# Patient Record
Sex: FEMALE | Race: OTHER | HISPANIC OR LATINO | URBAN - METROPOLITAN AREA
[De-identification: names, ages, dates, MRNs, and addresses within clinical notes are randomized per-mention and may not be internally consistent; named-entity substitution may affect disease eponyms.]

---

## 2024-09-18 ENCOUNTER — EMERGENCY (EMERGENCY)
Facility: HOSPITAL | Age: 49
LOS: 0 days | Discharge: HOME | End: 2024-09-18
Attending: EMERGENCY MEDICINE
Payer: MEDICAID

## 2024-09-18 VITALS
RESPIRATION RATE: 16 BRPM | SYSTOLIC BLOOD PRESSURE: 102 MMHG | OXYGEN SATURATION: 94 % | HEART RATE: 62 BPM | TEMPERATURE: 100 F | DIASTOLIC BLOOD PRESSURE: 66 MMHG

## 2024-09-18 VITALS
TEMPERATURE: 101 F | DIASTOLIC BLOOD PRESSURE: 67 MMHG | WEIGHT: 225.53 LBS | SYSTOLIC BLOOD PRESSURE: 130 MMHG | OXYGEN SATURATION: 95 % | HEART RATE: 100 BPM | RESPIRATION RATE: 18 BRPM

## 2024-09-18 DIAGNOSIS — R05.9 COUGH, UNSPECIFIED: ICD-10-CM

## 2024-09-18 DIAGNOSIS — J18.9 PNEUMONIA, UNSPECIFIED ORGANISM: ICD-10-CM

## 2024-09-18 DIAGNOSIS — R07.9 CHEST PAIN, UNSPECIFIED: ICD-10-CM

## 2024-09-18 DIAGNOSIS — H91.3 DEAF NONSPEAKING, NOT ELSEWHERE CLASSIFIED: ICD-10-CM

## 2024-09-18 DIAGNOSIS — R50.9 FEVER, UNSPECIFIED: ICD-10-CM

## 2024-09-18 LAB
FLUAV AG NPH QL: SIGNIFICANT CHANGE UP
FLUBV AG NPH QL: SIGNIFICANT CHANGE UP
RSV RNA NPH QL NAA+NON-PROBE: SIGNIFICANT CHANGE UP
SARS-COV-2 RNA SPEC QL NAA+PROBE: SIGNIFICANT CHANGE UP

## 2024-09-18 PROCEDURE — 99285 EMERGENCY DEPT VISIT HI MDM: CPT | Mod: 25

## 2024-09-18 PROCEDURE — 0241U: CPT

## 2024-09-18 PROCEDURE — 71046 X-RAY EXAM CHEST 2 VIEWS: CPT

## 2024-09-18 PROCEDURE — 99284 EMERGENCY DEPT VISIT MOD MDM: CPT

## 2024-09-18 PROCEDURE — 71046 X-RAY EXAM CHEST 2 VIEWS: CPT | Mod: 26

## 2024-09-18 PROCEDURE — 93010 ELECTROCARDIOGRAM REPORT: CPT

## 2024-09-18 PROCEDURE — 93005 ELECTROCARDIOGRAM TRACING: CPT

## 2024-09-18 RX ORDER — AZITHROMYCIN 500 MG/1
500 TABLET, FILM COATED ORAL ONCE
Refills: 0 | Status: COMPLETED | OUTPATIENT
Start: 2024-09-18 | End: 2024-09-18

## 2024-09-18 RX ORDER — CEFPODOXIME PROXETIL 100 MG/5ML
200 GRANULE, FOR SUSPENSION ORAL ONCE
Refills: 0 | Status: COMPLETED | OUTPATIENT
Start: 2024-09-18 | End: 2024-09-18

## 2024-09-18 RX ORDER — ACETAMINOPHEN 325 MG/1
650 TABLET ORAL ONCE
Refills: 0 | Status: COMPLETED | OUTPATIENT
Start: 2024-09-18 | End: 2024-09-18

## 2024-09-18 RX ORDER — AZITHROMYCIN 500 MG/1
1 TABLET, FILM COATED ORAL
Qty: 4 | Refills: 0
Start: 2024-09-18

## 2024-09-18 RX ORDER — CEFPODOXIME PROXETIL 100 MG/5ML
1 GRANULE, FOR SUSPENSION ORAL
Qty: 10 | Refills: 0
Start: 2024-09-18

## 2024-09-18 RX ADMIN — CEFPODOXIME PROXETIL 200 MILLIGRAM(S): 100 GRANULE, FOR SUSPENSION ORAL at 15:35

## 2024-09-18 RX ADMIN — ACETAMINOPHEN 650 MILLIGRAM(S): 325 TABLET ORAL at 14:54

## 2024-09-18 RX ADMIN — AZITHROMYCIN 500 MILLIGRAM(S): 500 TABLET, FILM COATED ORAL at 15:22

## 2024-09-18 NOTE — ED STATDOCS - PHYSICAL EXAMINATION
Well-appearing, NAD, low grad temperature PA NOTE: GEN: AOX3, NAD. HEENT: Throat clear. Airway is patent. EYES: PERRLA. EOMI. Head: NC/AT. NECK: Supple, No JVD. FROM. C-spine non-tender. CV:S1S2, RRR, LUNGS: Non-labored breathing, no tachypnea. O2sat 100% RA. CTA b/l. No w/r/r. CHEST: Equal chest expansion and rise. No deformity. ABD: Soft, NT/ND, no rebound, no guarding. No CVAT. EXT: No e/c/c. 2+ distal pulses. SKIN: No rashes. NEURO: No focal deficits. CN II-XII intact. FROM. 5/5 motor and sensory. ~George Padilla PA-C

## 2024-09-18 NOTE — ED STATDOCS - OBJECTIVE STATEMENT
48 year-old female with no pertinent past medical history presents complaining of chest pain. Pt is deaf and mute, but daughter interprets for her. Pt reportedly has chest pain, coughing, and a fever x3d. Doesn't do sign language but able to communicate with daughter.    used #[id number].

## 2024-09-18 NOTE — ED ADULT TRIAGE NOTE - CHIEF COMPLAINT QUOTE
pt is deaf and mute, Pt BIB family for cc of body aches, fevers, HA, +cough, and pain in chest x 15 days, EKG in progress.

## 2024-09-18 NOTE — ED STATDOCS - ATTENDING APP SHARED VISIT CONTRIBUTION OF CARE
I,Benson Landeros MD,  performed the initial face to face bedside interview with this patient regarding history of present illness, review of symptoms and relevant past medical, social and family history.  I completed an independent physical examination.  I was the initial provider who evaluated this patient. I have signed out the follow up of any pending tests (i.e. labs, radiological studies) to the ACP.  I have communicated the patient’s plan of care and disposition with the ACP.  The history, relevant review of systems, past medical and surgical history, medical decision making, and physical examination was documented by the scribe in my presence and I attest to the accuracy of the documentation.

## 2024-09-18 NOTE — ED STATDOCS - NSFOLLOWUPINSTRUCTIONS_ED_ALL_ED_FT
Neumonía extrahospitalaria en los adultos  Community-Acquired Pneumonia, Adult  La neumonía es taina infección pulmonar que causa inflamación y la acumulación de mucosidad y líquido en los pulmones. Deale puede causar tos y dificultad para respirar. La neumonía extrahospitalaria es aquella que se desarrolla en personas que no están, ni valdovinos estado recientemente, en un hospital u otro centro de atención médica.    Por lo general, la neumonía se desarrolla vandana resultado de taina enfermedad causada por un virus, vandana el resfrío común y la gripe (influenza). También puede ser causada por bacterias u hongos. Mientras que el resfrío y la gripe pueden transmitirse de taina persona a otra (son contagiosos), la neumonía en sí no se considera contagiosa.    ¿Cuáles son las causas?  The human body, showing how a virus travels from the air to a person's lungs.   Esta afección puede ser causada por lo siguiente:  Virus.  Bacterias.  Hongos.  ¿Qué incrementa el riesgo?  Los siguientes factores pueden hacer que sea más propenso a desarrollar esta afección:  Tener más de 65 años o tener ciertas afecciones, por ejemplo:  Taina enfermedad prolongada (crónica), vandana enfermedad pulmonar obstructiva crónica (EPOC), asma, insuficiencia cardíaca, diabetes o enfermedad renal.  Taina afección que incrementa el riesgo de respirar (aspirar) mucosidad y otros líquidos por la boca o la nariz.  Sistema de defensa del organismo (sistema inmunitario) debilitado.  Que le hayan extirpado el bazo (esplenectomía). El bazo es el órgano que ayuda a combatir gérmenes e infecciones.  No limpiarse jef los dientes y las encías (higiene dental deficiente).  Usar productos que contienen tabaco.  Viajar a lugares donde hay gérmenes que causan neumonía o estar cerca de ciertos animales o hábitats animales que podrían tener gérmenes que causan neumonía.  ¿Cuáles son los signos o síntomas?  Los síntomas de esta afección incluyen:  Tos seca o húmeda (productiva).  Fiebre, sudoración o escalofríos.  Dolor en el pecho; en especial, al respirar profundamente o toser.  Respiración rápida, dificultad para respirar o falta de aire.  Cansancio (fatiga) y live musculares.  ¿Cómo se diagnostica?  An outline of a person's body and an image of an X-ray of the person's chest.   Esta afección se puede diagnosticar mediante jesica antecedentes médicos y un examen físico. También pueden hacerle exámenes, que incluyen los siguientes:  Estudios de diagnóstico por imágenes, vandana taina radiografía torácica o taina ecografía pulmonar.  Pruebas de lo siguiente:  El nivel de oxígeno y otros gases en la brionna.  La mucosidad de los pulmones (esputo).  El líquido alrededor de los pulmones (líquido pleural).  La orina.  ¿Cómo se trata?  El tratamiento de esta afección depende de muchos factores; por ejemplo, la causa de la neumonía, los medicamentos que juan y otras afecciones que tenga.    En la mayoría de los adultos, la neumonía puede tratarse en casa. En algunos casos, el tratamiento debe realizarse en un hospital y puede incluir lo siguiente:  Medicamentos que se administran por boca (por vía oral) o a través de taina vía intravenosa (IV), vandana los siguientes:  Antibióticos, si la neumonía fue causada por taina bacteria.  Medicamentos que izzy a los virus (medicamentos antivirales), si un virus causó la neumonía.  Oxigenoterapia.  La neumonía grave, aunque es poco frecuente, puede requerir los siguientes tratamientos:  Ventilación mecánica. En stefanie procedimiento, se utiliza taina máquina para ayudarlo a respirar si no puede respirar jef por jesica propios medios o mantener un nivel seguro de oxígeno en la brionna.  Toracocentesis. Stefanie procedimiento elimina la acumulación de líquido pleural para ayudar a la respiración.  Siga estas instrucciones en blanco casa:  A comparison of three sample cups showing dark yellow, yellow, and pale yellow urine.  Medicamentos    Use los medicamentos de venta karena y los recetados solamente vandana se lo haya indicado el médico.  Use medicamentos para la tos solamente si tiene dificultad para dormir. Los medicamentos para la tos pueden impedir que el cuerpo elimine la mucosidad de los pulmones.  Si le recetaron antibióticos, tómelos vandana se lo haya indicado el médico. No deje de aparna el antibiótico aunque comience a sentirse mejor.  Estilo de rosi    A sign showing that a person should not drink alcohol.  A sign showing that a person should not smoke.  No christine alcohol.  No consuma ningún producto que contenga nicotina o tabaco. Estos productos incluyen cigarrillos, tabaco para mascar y aparatos de vapeo, vandana los cigarrillos electrónicos. Si necesita ayuda para dejar de fumar, consulte al médico.  Siga taina dieta saludable. Esta debe incluir muchas verduras, frutas, cereales integrales, productos lácteos bajos en grasa y proteínas magras.  Instrucciones generales    Descanse mucho y duerma vandana mínimo 8 horas todas las noches.  De noche, duerma en posición parcialmente erguida. Coloque algunas almohadas debajo de la michael o duerma en taina silla reclinable.  Retome jesica actividades normales vandana se lo haya indicado el médico. Pregúntele al médico qué actividades son seguras para usted.  Christine suficiente líquido vandana para mantener la orina de color amarillo pálido. Deale ayuda a diluir la mucosidad de los pulmones.  Si tiene dolor de garganta, julian gárgaras con taina mezcla de agua y sal 3 o 4 veces al día, o cuando sea necesario. Para preparar agua con sal, disuelva totalmente de ½ a 1 cucharadita (de 3 a 6 g) de sal en 1 taza (237 ml) de agua tibia.  Concurra a todas las visitas de seguimiento.  ¿Cómo se previene?  Puede disminuir el riesgo de contraer neumonía extrahospitalaria con las siguientes medidas:  Vacunarse contra la neumonía. Hay diferentes tipos y esquemas de vacunas contra la neumonía. Pregúntele al médico cuál es la opción más adecuada para usted. Considere la posibilidad de aplicarse la vacuna contra la neumonía si usted:  Es mayor de 65 años.  Tiene entre 19 y 65 años y está en tratamiento para el cáncer, tiene taina enfermedad pulmonar crónica o tiene otras afecciones que afectan el sistema inmunitario. Pregúntele al médico si esto se aplica en blanco negin.  Colocarse la vacuna contra la gripe todos los años. Pregúntele al médico cuál es el tipo de vacuna más adecuado para usted.  Realizarse controles dentales regulares.  Lavarse las rafi frecuentemente con agua y jabón guilherme al menos 20 segundos. Use desinfectante para rafi si no dispone de agua y jabón.  Comuníquese con un médico si:  Tiene fiebre.  Tiene dificultad para dormir porque no puede controlar la tos con medicamentos.  Solicite ayuda de inmediato si:  La falta de aire empeora.  El dolor torácico aumenta.  La enfermedad empeora, especialmente si usted es un adulto mayor o blanco sistema inmunitario es débil.  Tose y escupe brionna.  Estos síntomas pueden indicar taina emergencia. Solicite ayuda de inmediato. Llame al 911.  No espere a leonides si los síntomas desaparecen.  No conduzca por jesica propios medios hasta el hospital.  Resumen  La neumonía es taina infección en los pulmones.  La neumonía extrahospitalaria se desarrolla en personas que no valdovinos estado en el hospital. Puede ser causada por bacterias, virus u hongos.  Esta afección puede tratarse con antibióticos o medicamentos antivirales.  La neumonía grave puede requerir hospitalización y tratamiento para ayudar a la respiración.  Esta información no tiene vandana fin reemplazar el consejo del médico. Asegúrese de hacerle al médico cualquier pregunta que tenga.

## 2024-09-18 NOTE — ED STATDOCS - CLINICAL SUMMARY MEDICAL DECISION MAKING FREE TEXT BOX
48 year-old female with no pertinent past medical history presents complaining of chest pain. Likely viral syndrome, will x-ray to rule out pneumonia and Covid swab.

## 2024-09-18 NOTE — ED STATDOCS - PROGRESS NOTE DETAILS
PA: Patient is a 48 year-old female with no PMHx who presents to [facility] c/o fever, cough, chest congestion. Pt is deaf and mute, but daughter interprets for her. ~George Padilla PA-C PA note: All labwork/radiology results discussed in detail with patient. Patient re-examined and re-evaluated. Patient feels much better at this time. ED evaluation, Diagnosis and management discussed with the patient in detail. Workup results discussed with ED attending, OK to [place] home. Close PMD follow up encouraged, aftercare to assist with scheduling appointment ASAP. Strict ED return instructions discussed in detail and patient given the opportunity to ask any questions about their discharge diagnosis and instructions. Patient verbalized understanding. ~ George Padilla PA-C

## 2024-09-18 NOTE — ED STATDOCS - PATIENT PORTAL LINK FT
You can access the FollowMyHealth Patient Portal offered by [facility] by registering at the following website: [email or web address]. By joining [employer]’s FollowMyHealth portal, you will also be able to view your health information using other applications (apps) compatible with our system.

## 2024-09-18 NOTE — ED ADULT NURSE NOTE - NS_ED_NURSE_TEACHING_TOPIC_ED_A_ED
PT's assistant  Sandra from [facility]  [phone] educated on all d/c instructions/medications with return verbal understanding of all instructions to myself, all questions answered.